# Patient Record
Sex: MALE | Race: WHITE | Employment: FULL TIME | URBAN - METROPOLITAN AREA
[De-identification: names, ages, dates, MRNs, and addresses within clinical notes are randomized per-mention and may not be internally consistent; named-entity substitution may affect disease eponyms.]

---

## 2021-06-09 ENCOUNTER — TRANSCRIBE ORDER (OUTPATIENT)
Dept: SCHEDULING | Age: 61
End: 2021-06-09

## 2021-06-09 DIAGNOSIS — M81.0 OSTEOPOROSIS: ICD-10-CM

## 2021-06-09 DIAGNOSIS — E55.9 VITAMIN D DEFICIENCY, UNSPECIFIED: Primary | ICD-10-CM

## 2021-06-21 ENCOUNTER — HOSPITAL ENCOUNTER (OUTPATIENT)
Dept: BONE DENSITY | Age: 61
Discharge: HOME | End: 2021-06-21
Attending: INTERNAL MEDICINE
Payer: COMMERCIAL

## 2021-06-21 DIAGNOSIS — M81.0 OSTEOPOROSIS: ICD-10-CM

## 2021-06-21 DIAGNOSIS — E55.9 VITAMIN D DEFICIENCY, UNSPECIFIED: ICD-10-CM

## 2021-06-21 PROCEDURE — 77080 DXA BONE DENSITY AXIAL: CPT

## 2022-06-24 ENCOUNTER — HOSPITAL ENCOUNTER (OUTPATIENT)
Dept: ULTRASOUND IMAGING | Age: 62
Discharge: HOME | End: 2022-06-24
Payer: COMMERCIAL

## 2022-06-24 ENCOUNTER — TRANSCRIBE ORDER (OUTPATIENT)
Dept: SCHEDULING | Age: 62
End: 2022-06-24

## 2022-06-24 DIAGNOSIS — K40.90 UNILATERAL INGUINAL HERNIA WITHOUT OBSTRUCTION OR GANGRENE, RECURRENCE NOT SPECIFIED: Primary | ICD-10-CM

## 2022-06-24 DIAGNOSIS — K40.90 UNILATERAL INGUINAL HERNIA WITHOUT OBSTRUCTION OR GANGRENE, RECURRENCE NOT SPECIFIED: ICD-10-CM

## 2022-06-24 PROCEDURE — 76881 US COMPL JOINT R-T W/IMG: CPT

## 2022-06-24 PROCEDURE — 76882 US LMTD JT/FCL EVL NVASC XTR: CPT

## 2022-07-22 ENCOUNTER — OFFICE VISIT (OUTPATIENT)
Dept: SURGERY | Age: 62
End: 2022-07-22
Payer: COMMERCIAL

## 2022-07-22 VITALS
TEMPERATURE: 97.8 F | RESPIRATION RATE: 18 BRPM | DIASTOLIC BLOOD PRESSURE: 66 MMHG | BODY MASS INDEX: 24.2 KG/M2 | SYSTOLIC BLOOD PRESSURE: 114 MMHG | WEIGHT: 169 LBS | HEART RATE: 69 BPM | HEIGHT: 70 IN | OXYGEN SATURATION: 100 %

## 2022-07-22 DIAGNOSIS — K42.9 UMBILICAL HERNIA WITHOUT OBSTRUCTION AND WITHOUT GANGRENE: ICD-10-CM

## 2022-07-22 DIAGNOSIS — K40.90 LEFT INGUINAL HERNIA: Primary | ICD-10-CM

## 2022-07-22 PROCEDURE — 99205 OFFICE O/P NEW HI 60 MIN: CPT | Performed by: SURGERY

## 2022-07-22 RX ORDER — SODIUM CHLORIDE 0.9 % (FLUSH) 0.9 %
5-40 SYRINGE (ML) INJECTION EVERY 8 HOURS
Status: CANCELLED | OUTPATIENT
Start: 2022-07-22

## 2022-07-22 RX ORDER — IRBESARTAN AND HYDROCHLOROTHIAZIDE 150; 12.5 MG/1; MG/1
TABLET, FILM COATED ORAL DAILY
COMMUNITY
Start: 2022-07-15

## 2022-07-22 RX ORDER — SODIUM CHLORIDE 0.9 % (FLUSH) 0.9 %
5-40 SYRINGE (ML) INJECTION AS NEEDED
Status: CANCELLED | OUTPATIENT
Start: 2022-07-22

## 2022-07-22 NOTE — PROGRESS NOTES
[facility]  General Surgery    Subjective:     CC: Left inguinal hernia     HPI: Patient is a very pleasant 60-year-old male with past medical history remarkable for vitamin D deficiency and umbilical hernia diagnosed as a child. He was referred by Dr. Birgit Avila for evaluation and management of left inguinal hernia. Patient states that he has been having pain in the left groin and discomfort mostly with sitting in an upright position for the past 2 months. He describes it as a pressure. He uses Advil in the evenings sometimes alleviate the pain. He denies any nausea vomiting diarrhea constipation. When I asked the patient if he had ever had a hernia in the past he said that his mother told him that he had a hernia at his [facility] when he was a child. They used a corn and a belt to help to decrease the hernia. He does notice bulging at the umbilicus with occasional tenderness with touch. 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 He works from home 3 days/week. The patient does work out with core exercises and weightlifting. There are no problems to display for this patient. Past Medical History:   Diagnosis Date    Calcium deficiency 2015    Tibia fracture 2015    Vitamin D deficiency 2015      History reviewed. No pertinent surgical history. Family History   Problem Relation Age of Onset    Diabetes Mother       Social History     Tobacco Use    Smoking status: Never    Smokeless tobacco: Not on file   Substance Use Topics    Alcohol use: No      Allergies   Allergen Reactions    Penicillins Hives    Pollen Extracts Sneezing       Prior to Admission medications    Medication Sig Start Date End Date Taking? Authorizing Provider   irbesartan-hydroCHLOROthiazide (AVALIDE) 150-12.5 mg per tablet  7/15/22  Yes Provider, Historical   cholecalciferol, vitamin D3, 50 mcg (2,000 unit) tab Take 2,000 Units by mouth daily.    Yes Provider, Historical   calcium citrate 200 mg (950 mg) tablet Take 200 mg by mouth daily. Yes Provider, Historical       Review of Systems:    14 systems were reviewed. The results are as above in the HPI and otherwise negative. Objective:     Vitals:    07/22/22 0855   BP: 114/66   Pulse: 69   Resp: 18   Temp: 97.8 °F (36.6 °C)   SpO2: 100%   Weight: 76.7 kg (169 lb)   Height: 5' 10\" (1.778 m)       Physical Exam:  GENERAL: alert, cooperative, no distress, appears stated age,   EYE: conjunctivae/corneas clear. PERRL, EOM's intact. THROAT & NECK: normal and no erythema or exudates noted. ,    LYMPHATIC: Cervical, supraclavicular, and axillary nodes normal. ,   LUNG: clear to auscultation bilaterally,   HEART: regular rate and rhythm, S1, S2 normal, no murmur, click, rub or gallop,   ABDOMEN: soft, non-distended. Tender at risk reducible umbilical hernia. Bowel sounds normal. No masses,  no organomegaly,   Genitalia: Both testicles are descended into the scrotum and nontender. No tenderness of the spermatic cord. Small bulge present with cough in the left inguinal canal.  No hernia palpable in the right. EXTREMITIES:  extremities normal, atraumatic, no cyanosis or edema,   SKIN: Normal.,   NEUROLOGIC: AOx3. Cranial nerves 2-12 and sensation grossly intact. ,     Data Review:  to be done    Mr. Sharla Torres has a reminder for a \"due or due soon\" health maintenance. I have asked that he contact his primary care provider for follow-up on this health maintenance. Impression:     Patient with symptomatic reducible left inguinal hernia and occasionally symptomatic umbilical hernia which is also reducible.     Plan:     Robot-assisted laparoscopic umbilical hernia repair and left inguinal hernia repairs with mesh-transverse abdominis plane block, right sided docking and 11.4 cm [place] ventral light ST mesh  Consent on chart  Preoperative orders written    Signed By: Anna Barnett MD     July 22, 2022

## 2022-07-22 NOTE — PROGRESS NOTES
Dominique Soriano is a 65 y.o. male  Chief Complaint   Patient presents with    New Patient     Left groin discomfort       1. Have you been to the ER, urgent care clinic since your last visit? Hospitalized since your last visit? No    2. Have you seen or consulted any other health care providers outside of the [address] [place] [address] since your last visit? Include any pap smears or colon screening. No.  . Past Medical History:   Diagnosis Date    Calcium deficiency 1    Tibia fracture 2015    Vitamin D deficiency 2015       History reviewed. No pertinent surgical history. Family History   Problem Relation Age of Onset    Diabetes Mother        Social History     Socioeconomic History    Marital status:    Tobacco Use    Smoking status: Never   Substance and Sexual Activity    Alcohol use: No       Review of Systems - Review of Systems   Constitutional: Negative. HENT: Negative. Eyes: Negative. Respiratory: Negative. Cardiovascular: Negative. Gastrointestinal: Negative. Genitourinary: Negative. Musculoskeletal: Negative. Skin: Negative. Neurological: Negative. Endo/Heme/Allergies: Negative. Psychiatric/Behavioral: Negative. Outpatient Medications Marked as Taking for the 7/22/22 encounter (Office Visit) with Olimpia Gentile MD   Medication Sig Dispense Refill    irbesartan-hydroCHLOROthiazide (AVALIDE) 150-12.5 mg per tablet       cholecalciferol, vitamin D3, 50 mcg (2,000 unit) tab Take 2,000 Units by mouth daily.  calcium citrate 200 mg (950 mg) tablet Take 200 mg by mouth daily.          Allergies   Allergen Reactions    Penicillins Hives    Pollen Extracts Sneezing       Vitals:    07/22/22 0855   BP: 114/66   Pulse: 69   Resp: 18   Temp: 97.8 °F (36.6 °C)   SpO2: 100%   Weight: 169 lb (76.7 kg)   Height: 5' 10\" (1.778 m)   PainSc:   0 - No pain

## 2022-08-30 ENCOUNTER — OFFICE VISIT (OUTPATIENT)
Dept: SURGERY | Age: 62
End: 2022-08-30

## 2022-08-30 VITALS
OXYGEN SATURATION: 99 % | HEART RATE: 81 BPM | TEMPERATURE: 97.9 F | SYSTOLIC BLOOD PRESSURE: 124 MMHG | DIASTOLIC BLOOD PRESSURE: 78 MMHG | WEIGHT: 165 LBS | HEIGHT: 70 IN | RESPIRATION RATE: 18 BRPM | BODY MASS INDEX: 23.62 KG/M2

## 2022-08-30 DIAGNOSIS — K40.90 INGUINAL HERNIA OF LEFT SIDE WITHOUT OBSTRUCTION OR GANGRENE: ICD-10-CM

## 2022-08-30 DIAGNOSIS — K42.9 UMBILICAL HERNIA WITHOUT OBSTRUCTION AND WITHOUT GANGRENE: ICD-10-CM

## 2022-08-30 DIAGNOSIS — R10.84 GENERALIZED ABDOMINAL PAIN: Primary | ICD-10-CM

## 2022-08-30 PROCEDURE — 99214 OFFICE O/P EST MOD 30 MIN: CPT | Performed by: STUDENT IN AN ORGANIZED HEALTH CARE EDUCATION/TRAINING PROGRAM

## 2022-08-30 NOTE — PROGRESS NOTES
Suyapa Winter is a 65 y.o. male  Chief Complaint   Patient presents with    New Patient     Left inguinal hernia     Visit Vitals  /78   Pulse 81   Temp 97.9 °F (36.6 °C)   Resp 18   Ht 5' 10\" (1.778 m)   Wt 165 lb (74.8 kg)   SpO2 99%   BMI 23.68 kg/m²       Weight Loss Metrics 8/30/2022 8/30/2022 8/8/2022 8/2/2022 7/22/2022 5/23/2016 12/3/2015   Pre op / Initial Wt 165 - - - - - -   Today's Wt - 165 lb - 169 lb 169 lb 175 lb 165 lb   BMI - 23.68 kg/m2 24.25 kg/m2 - 24.25 kg/m2 25.11 kg/m2 23.68 kg/m2   Ideal Body Wt 157 - - - - - -   Excess Body Wt 8 - - - - - -   Goal Wt 159 - - - - - -   Wt loss to date 0 - - - - - -   % Wt Loss 0 - - - - - -   80% EBW 6.4 - - - - - -       Body mass index is 23.68 kg/m². 1. Have you been to the ER, urgent care clinic since your last visit? Hospitalized since your last visit? No    2. Have you seen or consulted any other health care providers outside of the [address] [place] [address] since your last visit? Include any pap smears or colon screening.  No

## 2022-08-31 NOTE — PROGRESS NOTES
General Surgery Initial Consult    Patient: Dieter Pritchard MRN: [id number]  SSN: [id number]    YOB: 1960  Age: 65 y.o. Sex: male      Subjective:      Dieter Pritchard is a 65 y.o. male who is being seen for initial consultation for symptomatic left inguinal hernia. Mr. Sharla Torres was seen previously by Dr. Lewis Lipscomb and scheduled for surgery, however I have been asked to assume his care given Dr. Lewis Lipscomb current absence. He notes first experiencing discomfort in the left groin starting in May of this year. He noticed it primarily when sitting for long periods as well as during strenuous exercise. He denies any specific inciting events. He says that he had been going to the gym approximately 3 times a week though he has minimized his activity since the discomfort started. He denies any specific bulges. He denies any nausea, vomiting, changes in his bowel habits, or signs or symptoms of obstruction. He does not think the discomfort has increased over time though he has been modifying his activities accordingly. He was also noted previously to have a umbilical hernia. He states this has been present most of his life. He says he was told by his mother it was quite large after birth and describes something similar to an omphalocele. He denies any symptoms from the umbilical hernia. Allergies   Allergen Reactions    Penicillins Hives    Pollen Extracts Sneezing     Current Outpatient Medications   Medication Sig Dispense Refill    irbesartan-hydroCHLOROthiazide (AVALIDE) 150-12.5 mg per tablet daily. cholecalciferol, vitamin D3, 50 mcg (2,000 unit) tab Take 2,000 Units by mouth daily. calcium citrate 200 mg (950 mg) tablet Take 200 mg by mouth daily.        Past Medical History:   Diagnosis Date    Calcium deficiency 01/01/2015    Hypertension     Tibia fracture 01/01/2015    Vitamin D deficiency 01/01/2015     Past Surgical History:   Procedure Laterality Date    HX APPENDECTOMY      HX HEENT  2018    jaw surgery      Social History     Tobacco Use    Smoking status: Never    Smokeless tobacco: Never   Substance Use Topics    Alcohol use: No     Family History   Problem Relation Age of Onset    Diabetes Mother    Stroke, several family members        Review of Systems:    General: Denies fevers, chills, night sweats, fatigue, weight loss, or weight gain. HEENT: Denies changes in auditory or visual acuity, recurrent pharyngitis, epistaxis, chronic rhinorrhea, vertigo    Respiratory: Denies increasing shortness of breath, productive cough, hemoptysis    Cardiac: Denies known history of cardiac disease, heart murmur, palpitations    GI: Left groin discomfort as per HPI. Denies dysphagia, recurrent emesis, hematemesis, changes in bowel habits, hematochezia, melena    : Denies hematuria frequency urgency dysuria    Musculoskeletal: Denies fractures, dislocations    Neurologic: Denies history of CVA, paralysis paresthesias, recurrent cephalgia, seizures    Endocrine: Denies polyuria, polydipsia, polyphagia, heat and cold intolerance    Lymph/heme: Denies a history of malignancy, anemia, bruising, blood transfusions    Integumentary: Negative for dermatitis     Psych: Denies a history of depression or anxiety    Objective:     Vitals:    08/30/22 1407   BP: 124/78   Pulse: 81   Resp: 18   Temp: 97.9 °F (36.6 °C)   SpO2: 99%   Weight: 74.8 kg (165 lb)   Height: 5' 10\" (1.778 m)        General: no acute distress, nontoxic in appearance. Head: Normocephalic, atraumatic  Mouth: Clear, no overt lesions, oral mucosa is pink and moist.  Neck: Supple, no masses, no adenopathy or carotid bruits, trachea midline  Resp: Clear to auscultation bilaterally, no wheezing, rhonchi, or rales, excursions normal and symmetrical.  Cardio: Regular rate and rhythm, no murmurs, clicks, gallops, or rubs. Abdomen: soft, nontender, nondistended, normoactive bowel sounds.   There is a small, mildly tender approximately 1 cm umbilical hernia. On palpation of the right groin there are some laxity over the myopectineal orifice with Valsalva. Direct palpation of the inguinal ring reveals a nontender sliding bulge. Similarly palpation of the left groin reveals some weakness of the inguinal floor. Palpation of the internal ring reveals a tender sliding protrusion with Valsalva. Extremities: Warm, well perfused, no tenderness or swelling, normal gait/station, without edema or varicosities  Neuro: Sensation and strength grossly intact and symmetrical.  Psych: Alert and oriented to person, place, and time. Labs:     N/A    Imaging:     N/A      Assessment: This patient is a 65 y.o. male with symptomatic left inguinal hernia, asymptomatic umbilical hernia and a possibly asymptomatic right inguinal hernia also based on exam      Plan:     I had a long discussion with the patient regarding the proposed surgical plan, which differs a bit from prior planned surgery. I will plan for a robotic approach and to fix the left-sided hernia. I had a discussion with the patient regarding whether to repair an asymptomatic right-sided hernia if indeed one is found during the procedure as I suspect it may given his exam.  We discussed the pros and cons of fixing this hernia at the time of surgery and he has requested that I fix any right-sided hernia that is visualized. I also discussed approach to repairing his umbilical hernia. On exam it appears to be only about 1 cm, and given that it is asymptomatic I think he would be better served with a primary repair for this hernia rather than a large IPOM mesh which might be overkill in this case. The patient has elected for laparoscopic/robotic inguinal hernia repair. Risks of this procedure, including DVT, mesh infection or intolerance, acute or chronic pain, hernia recurrence, port site hernia, surgical site infection were discussed and he is agreeable to proceed.  We discussed lifting precautions and the expected recovery timeline in depth. He understands this recovery timeline is not absolute and can vary for each individual patient. He understands the plan as detailed above and is agreeable to proceed.       Signed By: Alison Ford MD     August 30, 2022

## 2022-08-31 NOTE — H&P (VIEW-ONLY)
General Surgery Initial Consult    Patient: Alex Figueroa MRN: [id number]  SSN: [id number]    YOB: 1960  Age: 65 y.o. Sex: male      Subjective:      Alex Figueroa is a 65 y.o. male who is being seen for initial consultation for symptomatic left inguinal hernia. Mr. Jem Roberson was seen previously by Dr. Umm Kirby and scheduled for surgery, however I have been asked to assume his care given Dr. Umm Kirby current absence. He notes first experiencing discomfort in the left groin starting in May of this year. He noticed it primarily when sitting for long periods as well as during strenuous exercise. He denies any specific inciting events. He says that he had been going to the gym approximately 3 times a week though he has minimized his activity since the discomfort started. He denies any specific bulges. He denies any nausea, vomiting, changes in his bowel habits, or signs or symptoms of obstruction. He does not think the discomfort has increased over time though he has been modifying his activities accordingly. He was also noted previously to have a umbilical hernia. He states this has been present most of his life. He says he was told by his mother it was quite large after birth and describes something similar to an omphalocele. He denies any symptoms from the umbilical hernia. Allergies   Allergen Reactions    Penicillins Hives    Pollen Extracts Sneezing     Current Outpatient Medications   Medication Sig Dispense Refill    irbesartan-hydroCHLOROthiazide (AVALIDE) 150-12.5 mg per tablet daily. cholecalciferol, vitamin D3, 50 mcg (2,000 unit) tab Take 2,000 Units by mouth daily. calcium citrate 200 mg (950 mg) tablet Take 200 mg by mouth daily.        Past Medical History:   Diagnosis Date    Calcium deficiency 01/01/2015    Hypertension     Tibia fracture 01/01/2015    Vitamin D deficiency 01/01/2015     Past Surgical History:   Procedure Laterality Date    HX APPENDECTOMY      HX HEENT  2018    jaw surgery      Social History     Tobacco Use    Smoking status: Never    Smokeless tobacco: Never   Substance Use Topics    Alcohol use: No     Family History   Problem Relation Age of Onset    Diabetes Mother    Stroke, several family members        Review of Systems:    General: Denies fevers, chills, night sweats, fatigue, weight loss, or weight gain. HEENT: Denies changes in auditory or visual acuity, recurrent pharyngitis, epistaxis, chronic rhinorrhea, vertigo    Respiratory: Denies increasing shortness of breath, productive cough, hemoptysis    Cardiac: Denies known history of cardiac disease, heart murmur, palpitations    GI: Left groin discomfort as per HPI. Denies dysphagia, recurrent emesis, hematemesis, changes in bowel habits, hematochezia, melena    : Denies hematuria frequency urgency dysuria    Musculoskeletal: Denies fractures, dislocations    Neurologic: Denies history of CVA, paralysis paresthesias, recurrent cephalgia, seizures    Endocrine: Denies polyuria, polydipsia, polyphagia, heat and cold intolerance    Lymph/heme: Denies a history of malignancy, anemia, bruising, blood transfusions    Integumentary: Negative for dermatitis     Psych: Denies a history of depression or anxiety    Objective:     Vitals:    08/30/22 1407   BP: 124/78   Pulse: 81   Resp: 18   Temp: 97.9 °F (36.6 °C)   SpO2: 99%   Weight: 74.8 kg (165 lb)   Height: 5' 10\" (1.778 m)        General: no acute distress, nontoxic in appearance. Head: Normocephalic, atraumatic  Mouth: Clear, no overt lesions, oral mucosa is pink and moist.  Neck: Supple, no masses, no adenopathy or carotid bruits, trachea midline  Resp: Clear to auscultation bilaterally, no wheezing, rhonchi, or rales, excursions normal and symmetrical.  Cardio: Regular rate and rhythm, no murmurs, clicks, gallops, or rubs. Abdomen: soft, nontender, nondistended, normoactive bowel sounds.   There is a small, mildly tender approximately 1 cm umbilical hernia. On palpation of the right groin there are some laxity over the myopectineal orifice with Valsalva. Direct palpation of the inguinal ring reveals a nontender sliding bulge. Similarly palpation of the left groin reveals some weakness of the inguinal floor. Palpation of the internal ring reveals a tender sliding protrusion with Valsalva. Extremities: Warm, well perfused, no tenderness or swelling, normal gait/station, without edema or varicosities  Neuro: Sensation and strength grossly intact and symmetrical.  Psych: Alert and oriented to person, place, and time. Labs:     N/A    Imaging:     N/A      Assessment: This patient is a 65 y.o. male with symptomatic left inguinal hernia, asymptomatic umbilical hernia and a possibly asymptomatic right inguinal hernia also based on exam      Plan:     I had a long discussion with the patient regarding the proposed surgical plan, which differs a bit from prior planned surgery. I will plan for a robotic approach and to fix the left-sided hernia. I had a discussion with the patient regarding whether to repair an asymptomatic right-sided hernia if indeed one is found during the procedure as I suspect it may given his exam.  We discussed the pros and cons of fixing this hernia at the time of surgery and he has requested that I fix any right-sided hernia that is visualized. I also discussed approach to repairing his umbilical hernia. On exam it appears to be only about 1 cm, and given that it is asymptomatic I think he would be better served with a primary repair for this hernia rather than a large IPOM mesh which might be overkill in this case. The patient has elected for laparoscopic/robotic inguinal hernia repair. Risks of this procedure, including DVT, mesh infection or intolerance, acute or chronic pain, hernia recurrence, port site hernia, surgical site infection were discussed and he is agreeable to proceed.  We discussed lifting precautions and the expected recovery timeline in depth. He understands this recovery timeline is not absolute and can vary for each individual patient. He understands the plan as detailed above and is agreeable to proceed.       Signed By: Que Andujar MD     August 30, 2022

## 2022-09-02 NOTE — PERIOP NOTES
PRE-SURGICAL INSTRUCTIONS        Patient's Name:  Bradford Esteban      HGRCF'I Date:  9/2/2022            Covid Testing Date and Time:  vaccinated    Surgery Date:  9/12/2022                Do NOT eat or drink anything, including candy, gum, or ice chips after midnight on 9/12, unless you have specific instructions from your surgeon or anesthesia provider to do so. You may brush your teeth before coming to the hospital.  No smoking 24 hours prior to the day of surgery. No alcohol 24 hours prior to the day of surgery. No recreational drugs for one week prior to the day of surgery. Leave all valuables, including money/purse, at home. Remove all jewelry, nail polish, acrylic nails, and makeup (including mascara); no lotions powders, deodorant, or perfume/cologne/after shave on the skin. Follow instruction for Hibiclens washes and CHG wipes from surgeon's office. Glasses/contact lenses and dentures may be worn to the hospital.  They will be removed prior to surgery. Call your doctor if symptoms of a cold or illness develop within 24-48 hours prior to your surgery. 11.  If you are having an outpatient procedure, please make arrangements for a responsible ADULT TO [address] [place] [address] and stay with you for 24 hours after your surgery. 12. ONE VISITOR in the hospital at this time for outpatient procedures. Exceptions may be made for surgical admissions, per nursing unit guidelines      Special Instructions:      Bring list of CURRENT medications. Bring any pertinent legal medical records. Take these medications the morning of surgery with a sip of water:  as directed by MD   Follow physician instructions about stopping anticoagulants. Complete bowel prep per MD instructions. On the day of surgery, come in the main entrance of [facility]. Let the  at the desk know you are there for surgery.   A staff member will come escort you to the surgical area on the second floor.    If you have any questions or concerns, please do not hesitate to call:     (Prior to the day of surgery) [facility] department:  [phone]   (Day of surgery) Pre-Op department:  [phone]    These surgical instructions were reviewed with Manav Gifford during the [facility] phone call.

## 2022-09-09 ENCOUNTER — ANESTHESIA EVENT (OUTPATIENT)
Dept: SURGERY | Age: 62
End: 2022-09-09
Payer: COMMERCIAL

## 2022-09-12 ENCOUNTER — ANESTHESIA (OUTPATIENT)
Dept: SURGERY | Age: 62
End: 2022-09-12
Payer: COMMERCIAL

## 2022-09-12 ENCOUNTER — HOSPITAL ENCOUNTER (OUTPATIENT)
Age: 62
Setting detail: OUTPATIENT SURGERY
Discharge: HOME | End: 2022-09-12
Attending: STUDENT IN AN ORGANIZED HEALTH CARE EDUCATION/TRAINING PROGRAM | Admitting: STUDENT IN AN ORGANIZED HEALTH CARE EDUCATION/TRAINING PROGRAM
Payer: COMMERCIAL

## 2022-09-12 VITALS
OXYGEN SATURATION: 100 % | RESPIRATION RATE: 18 BRPM | WEIGHT: 161.5 LBS | HEART RATE: 71 BPM | SYSTOLIC BLOOD PRESSURE: 110 MMHG | BODY MASS INDEX: 23.12 KG/M2 | HEIGHT: 70 IN | DIASTOLIC BLOOD PRESSURE: 64 MMHG | TEMPERATURE: 98.3 F

## 2022-09-12 DIAGNOSIS — K42.9 UMBILICAL HERNIA WITHOUT OBSTRUCTION AND WITHOUT GANGRENE: ICD-10-CM

## 2022-09-12 DIAGNOSIS — K40.91 UNILATERAL RECURRENT INGUINAL HERNIA WITHOUT OBSTRUCTION OR GANGRENE: Primary | ICD-10-CM

## 2022-09-12 PROCEDURE — 77030012770 HC TRCR OPT FX AMR -B: Performed by: STUDENT IN AN ORGANIZED HEALTH CARE EDUCATION/TRAINING PROGRAM

## 2022-09-12 PROCEDURE — 77030040361 HC SLV COMPR DVT MDII -B: Performed by: STUDENT IN AN ORGANIZED HEALTH CARE EDUCATION/TRAINING PROGRAM

## 2022-09-12 PROCEDURE — 2709999900 HC NON-CHARGEABLE SUPPLY: Performed by: STUDENT IN AN ORGANIZED HEALTH CARE EDUCATION/TRAINING PROGRAM

## 2022-09-12 PROCEDURE — 76010000876 HC OR TIME 2 TO 2.5HR INTENSV - TIER 2: Performed by: STUDENT IN AN ORGANIZED HEALTH CARE EDUCATION/TRAINING PROGRAM

## 2022-09-12 PROCEDURE — 77030018673: Performed by: STUDENT IN AN ORGANIZED HEALTH CARE EDUCATION/TRAINING PROGRAM

## 2022-09-12 PROCEDURE — 99024 POSTOP FOLLOW-UP VISIT: CPT | Performed by: STUDENT IN AN ORGANIZED HEALTH CARE EDUCATION/TRAINING PROGRAM

## 2022-09-12 PROCEDURE — C1781 MESH (IMPLANTABLE): HCPCS | Performed by: STUDENT IN AN ORGANIZED HEALTH CARE EDUCATION/TRAINING PROGRAM

## 2022-09-12 PROCEDURE — 77030040830 HC CATH URETH FOL MDII -A: Performed by: STUDENT IN AN ORGANIZED HEALTH CARE EDUCATION/TRAINING PROGRAM

## 2022-09-12 PROCEDURE — 74011000250 HC RX REV CODE- 250: Performed by: NURSE ANESTHETIST, CERTIFIED REGISTERED

## 2022-09-12 PROCEDURE — 76210000006 HC OR PH I REC 0.5 TO 1 HR: Performed by: STUDENT IN AN ORGANIZED HEALTH CARE EDUCATION/TRAINING PROGRAM

## 2022-09-12 PROCEDURE — 77030013079 HC BLNKT BAIR HGGR 3M -A: Performed by: ANESTHESIOLOGY

## 2022-09-12 PROCEDURE — 74011250637 HC RX REV CODE- 250/637: Performed by: NURSE ANESTHETIST, CERTIFIED REGISTERED

## 2022-09-12 PROCEDURE — 74011000250 HC RX REV CODE- 250: Performed by: STUDENT IN AN ORGANIZED HEALTH CARE EDUCATION/TRAINING PROGRAM

## 2022-09-12 PROCEDURE — 76210000026 HC REC RM PH II 1 TO 1.5 HR: Performed by: STUDENT IN AN ORGANIZED HEALTH CARE EDUCATION/TRAINING PROGRAM

## 2022-09-12 PROCEDURE — 77030018836 HC SOL IRR NACL ICUM -A: Performed by: STUDENT IN AN ORGANIZED HEALTH CARE EDUCATION/TRAINING PROGRAM

## 2022-09-12 PROCEDURE — 77030020703 HC SEAL CANN DISP INTU -B: Performed by: STUDENT IN AN ORGANIZED HEALTH CARE EDUCATION/TRAINING PROGRAM

## 2022-09-12 PROCEDURE — 77030022704 HC SUT VLOC COVD -B: Performed by: STUDENT IN AN ORGANIZED HEALTH CARE EDUCATION/TRAINING PROGRAM

## 2022-09-12 PROCEDURE — 74011250636 HC RX REV CODE- 250/636: Performed by: NURSE ANESTHETIST, CERTIFIED REGISTERED

## 2022-09-12 PROCEDURE — 74011250636 HC RX REV CODE- 250/636: Performed by: ANESTHESIOLOGY

## 2022-09-12 PROCEDURE — 77030033188 HC TBNG FLTRD BIIFUR DISP CNMD -C: Performed by: STUDENT IN AN ORGANIZED HEALTH CARE EDUCATION/TRAINING PROGRAM

## 2022-09-12 PROCEDURE — 77030040922 HC BLNKT HYPOTHRM STRY -A: Performed by: STUDENT IN AN ORGANIZED HEALTH CARE EDUCATION/TRAINING PROGRAM

## 2022-09-12 PROCEDURE — 74011250636 HC RX REV CODE- 250/636: Performed by: STUDENT IN AN ORGANIZED HEALTH CARE EDUCATION/TRAINING PROGRAM

## 2022-09-12 PROCEDURE — 77030031139 HC SUT VCRL2 J&J -A: Performed by: STUDENT IN AN ORGANIZED HEALTH CARE EDUCATION/TRAINING PROGRAM

## 2022-09-12 PROCEDURE — 77030002933 HC SUT MCRYL J&J -A: Performed by: STUDENT IN AN ORGANIZED HEALTH CARE EDUCATION/TRAINING PROGRAM

## 2022-09-12 PROCEDURE — 76060000035 HC ANESTHESIA 2 TO 2.5 HR: Performed by: STUDENT IN AN ORGANIZED HEALTH CARE EDUCATION/TRAINING PROGRAM

## 2022-09-12 PROCEDURE — 77030016151 HC PROTCTR LNS DFOG COVD -B: Performed by: STUDENT IN AN ORGANIZED HEALTH CARE EDUCATION/TRAINING PROGRAM

## 2022-09-12 PROCEDURE — 77030035277 HC OBTRTR BLDELSS DISP INTU -B: Performed by: STUDENT IN AN ORGANIZED HEALTH CARE EDUCATION/TRAINING PROGRAM

## 2022-09-12 PROCEDURE — 49585 PR REPAIR UMBILICAL HERN,5+Y/O,REDUC: CPT | Performed by: STUDENT IN AN ORGANIZED HEALTH CARE EDUCATION/TRAINING PROGRAM

## 2022-09-12 PROCEDURE — 77030008683 HC TU ET CUF COVD -A: Performed by: ANESTHESIOLOGY

## 2022-09-12 PROCEDURE — 49650 LAP ING HERNIA REPAIR INIT: CPT | Performed by: STUDENT IN AN ORGANIZED HEALTH CARE EDUCATION/TRAINING PROGRAM

## 2022-09-12 PROCEDURE — 00790 ANES IPER UPR ABD NOS: CPT | Performed by: ANESTHESIOLOGY

## 2022-09-12 PROCEDURE — S2900 ROBOTIC SURGICAL SYSTEM: HCPCS | Performed by: STUDENT IN AN ORGANIZED HEALTH CARE EDUCATION/TRAINING PROGRAM

## 2022-09-12 DEVICE — MESH CS LEFT LARGE 10CM X 16CM: Type: IMPLANTABLE DEVICE | Site: GROIN | Status: FUNCTIONAL

## 2022-09-12 RX ORDER — ROCURONIUM BROMIDE 10 MG/ML
INJECTION, SOLUTION INTRAVENOUS AS NEEDED
Status: DISCONTINUED | OUTPATIENT
Start: 2022-09-12 | End: 2022-09-12 | Stop reason: HOSPADM

## 2022-09-12 RX ORDER — SODIUM CHLORIDE 0.9 % (FLUSH) 0.9 %
5-40 SYRINGE (ML) INJECTION AS NEEDED
Status: CANCELLED | OUTPATIENT
Start: 2022-09-12

## 2022-09-12 RX ORDER — ONDANSETRON 2 MG/ML
4 INJECTION INTRAMUSCULAR; INTRAVENOUS
Status: CANCELLED | OUTPATIENT
Start: 2022-09-12

## 2022-09-12 RX ORDER — SODIUM CHLORIDE 0.9 % (FLUSH) 0.9 %
5-40 SYRINGE (ML) INJECTION EVERY 8 HOURS
Status: DISCONTINUED | OUTPATIENT
Start: 2022-09-12 | End: 2022-09-12 | Stop reason: HOSPADM

## 2022-09-12 RX ORDER — HYDROMORPHONE HYDROCHLORIDE 2 MG/ML
0.5 INJECTION, SOLUTION INTRAMUSCULAR; INTRAVENOUS; SUBCUTANEOUS
Status: CANCELLED | OUTPATIENT
Start: 2022-09-12

## 2022-09-12 RX ORDER — SODIUM CHLORIDE 0.9 % (FLUSH) 0.9 %
5-40 SYRINGE (ML) INJECTION AS NEEDED
Status: DISCONTINUED | OUTPATIENT
Start: 2022-09-12 | End: 2022-09-12 | Stop reason: HOSPADM

## 2022-09-12 RX ORDER — DEXAMETHASONE SODIUM PHOSPHATE 4 MG/ML
INJECTION, SOLUTION INTRA-ARTICULAR; INTRALESIONAL; INTRAMUSCULAR; INTRAVENOUS; SOFT TISSUE AS NEEDED
Status: DISCONTINUED | OUTPATIENT
Start: 2022-09-12 | End: 2022-09-12 | Stop reason: HOSPADM

## 2022-09-12 RX ORDER — SODIUM CHLORIDE, SODIUM LACTATE, POTASSIUM CHLORIDE, CALCIUM CHLORIDE 600; 310; 30; 20 MG/100ML; MG/100ML; MG/100ML; MG/100ML
25 INJECTION, SOLUTION INTRAVENOUS CONTINUOUS
Status: DISCONTINUED | OUTPATIENT
Start: 2022-09-12 | End: 2022-09-12 | Stop reason: HOSPADM

## 2022-09-12 RX ORDER — DEXMEDETOMIDINE HYDROCHLORIDE 100 UG/ML
INJECTION, SOLUTION INTRAVENOUS AS NEEDED
Status: DISCONTINUED | OUTPATIENT
Start: 2022-09-12 | End: 2022-09-12 | Stop reason: HOSPADM

## 2022-09-12 RX ORDER — TAMSULOSIN HYDROCHLORIDE 0.4 MG/1
0.4 CAPSULE ORAL ONCE
Status: DISCONTINUED | OUTPATIENT
Start: 2022-09-12 | End: 2022-09-12 | Stop reason: HOSPADM

## 2022-09-12 RX ORDER — SUCCINYLCHOLINE CHLORIDE 20 MG/ML
INJECTION INTRAMUSCULAR; INTRAVENOUS AS NEEDED
Status: DISCONTINUED | OUTPATIENT
Start: 2022-09-12 | End: 2022-09-12 | Stop reason: HOSPADM

## 2022-09-12 RX ORDER — BUPIVACAINE HYDROCHLORIDE 2.5 MG/ML
INJECTION, SOLUTION EPIDURAL; INFILTRATION; INTRACAUDAL AS NEEDED
Status: DISCONTINUED | OUTPATIENT
Start: 2022-09-12 | End: 2022-09-12 | Stop reason: HOSPADM

## 2022-09-12 RX ORDER — VANCOMYCIN HYDROCHLORIDE
1250 ONCE
Status: COMPLETED | OUTPATIENT
Start: 2022-09-12 | End: 2022-09-12

## 2022-09-12 RX ORDER — SODIUM CHLORIDE, SODIUM LACTATE, POTASSIUM CHLORIDE, CALCIUM CHLORIDE 600; 310; 30; 20 MG/100ML; MG/100ML; MG/100ML; MG/100ML
50 INJECTION, SOLUTION INTRAVENOUS CONTINUOUS
Status: CANCELLED | OUTPATIENT
Start: 2022-09-12

## 2022-09-12 RX ORDER — SODIUM CHLORIDE 0.9 % (FLUSH) 0.9 %
5-40 SYRINGE (ML) INJECTION EVERY 8 HOURS
Status: CANCELLED | OUTPATIENT
Start: 2022-09-12

## 2022-09-12 RX ORDER — LIDOCAINE HYDROCHLORIDE 10 MG/ML
0.1 INJECTION, SOLUTION EPIDURAL; INFILTRATION; INTRACAUDAL; PERINEURAL AS NEEDED
Status: DISCONTINUED | OUTPATIENT
Start: 2022-09-12 | End: 2022-09-12 | Stop reason: HOSPADM

## 2022-09-12 RX ORDER — NEOSTIGMINE METHYLSULFATE 1 MG/ML
INJECTION, SOLUTION INTRAVENOUS AS NEEDED
Status: DISCONTINUED | OUTPATIENT
Start: 2022-09-12 | End: 2022-09-12 | Stop reason: HOSPADM

## 2022-09-12 RX ORDER — ONDANSETRON 2 MG/ML
INJECTION INTRAMUSCULAR; INTRAVENOUS AS NEEDED
Status: DISCONTINUED | OUTPATIENT
Start: 2022-09-12 | End: 2022-09-12 | Stop reason: HOSPADM

## 2022-09-12 RX ORDER — FAMOTIDINE 20 MG/1
20 TABLET, FILM COATED ORAL ONCE
Status: COMPLETED | OUTPATIENT
Start: 2022-09-12 | End: 2022-09-12

## 2022-09-12 RX ORDER — FENTANYL CITRATE 50 UG/ML
25 INJECTION, SOLUTION INTRAMUSCULAR; INTRAVENOUS
Status: CANCELLED | OUTPATIENT
Start: 2022-09-12

## 2022-09-12 RX ORDER — GLYCOPYRROLATE 0.2 MG/ML
INJECTION INTRAMUSCULAR; INTRAVENOUS AS NEEDED
Status: DISCONTINUED | OUTPATIENT
Start: 2022-09-12 | End: 2022-09-12 | Stop reason: HOSPADM

## 2022-09-12 RX ORDER — KETOROLAC TROMETHAMINE 15 MG/ML
15 INJECTION, SOLUTION INTRAMUSCULAR; INTRAVENOUS ONCE
Status: COMPLETED | OUTPATIENT
Start: 2022-09-12 | End: 2022-09-12

## 2022-09-12 RX ORDER — PROCHLORPERAZINE EDISYLATE 5 MG/ML
5 INJECTION INTRAMUSCULAR; INTRAVENOUS
Status: CANCELLED | OUTPATIENT
Start: 2022-09-12

## 2022-09-12 RX ORDER — FENTANYL CITRATE 50 UG/ML
INJECTION, SOLUTION INTRAMUSCULAR; INTRAVENOUS AS NEEDED
Status: DISCONTINUED | OUTPATIENT
Start: 2022-09-12 | End: 2022-09-12 | Stop reason: HOSPADM

## 2022-09-12 RX ORDER — PROPOFOL 10 MG/ML
INJECTION, EMULSION INTRAVENOUS AS NEEDED
Status: DISCONTINUED | OUTPATIENT
Start: 2022-09-12 | End: 2022-09-12 | Stop reason: HOSPADM

## 2022-09-12 RX ORDER — MIDAZOLAM HYDROCHLORIDE 1 MG/ML
INJECTION, SOLUTION INTRAMUSCULAR; INTRAVENOUS AS NEEDED
Status: DISCONTINUED | OUTPATIENT
Start: 2022-09-12 | End: 2022-09-12 | Stop reason: HOSPADM

## 2022-09-12 RX ORDER — PROMETHAZINE HYDROCHLORIDE 12.5 MG/1
12.5 TABLET ORAL
Status: CANCELLED | OUTPATIENT
Start: 2022-09-12

## 2022-09-12 RX ORDER — DOCUSATE SODIUM 100 MG/1
100 CAPSULE, LIQUID FILLED ORAL 2 TIMES DAILY
Qty: 60 CAPSULE | Refills: 0 | Status: SHIPPED | OUTPATIENT
Start: 2022-09-12 | End: 2022-10-12

## 2022-09-12 RX ORDER — LIDOCAINE HYDROCHLORIDE 20 MG/ML
INJECTION, SOLUTION EPIDURAL; INFILTRATION; INTRACAUDAL; PERINEURAL AS NEEDED
Status: DISCONTINUED | OUTPATIENT
Start: 2022-09-12 | End: 2022-09-12 | Stop reason: HOSPADM

## 2022-09-12 RX ADMIN — PROPOFOL 200 MG: 10 INJECTION, EMULSION INTRAVENOUS at 09:54

## 2022-09-12 RX ADMIN — DEXMEDETOMIDINE HYDROCHLORIDE 10 MCG: 100 INJECTION, SOLUTION INTRAVENOUS at 10:22

## 2022-09-12 RX ADMIN — DEXAMETHASONE SODIUM PHOSPHATE 4 MG: 4 INJECTION, SOLUTION INTRAMUSCULAR; INTRAVENOUS at 10:05

## 2022-09-12 RX ADMIN — SUCCINYLCHOLINE CHLORIDE 100 MG: 20 INJECTION, SOLUTION INTRAMUSCULAR; INTRAVENOUS at 09:54

## 2022-09-12 RX ADMIN — DEXMEDETOMIDINE HYDROCHLORIDE 10 MCG: 100 INJECTION, SOLUTION INTRAVENOUS at 11:07

## 2022-09-12 RX ADMIN — MIDAZOLAM HYDROCHLORIDE 2 MG: 2 INJECTION, SOLUTION INTRAMUSCULAR; INTRAVENOUS at 09:46

## 2022-09-12 RX ADMIN — ROCURONIUM BROMIDE 30 MG: 50 INJECTION INTRAVENOUS at 10:05

## 2022-09-12 RX ADMIN — NEOSTIGMINE METHYLSULFATE 3 MG: 1 INJECTION INTRAVENOUS at 11:38

## 2022-09-12 RX ADMIN — FAMOTIDINE 20 MG: 20 TABLET ORAL at 07:54

## 2022-09-12 RX ADMIN — KETOROLAC TROMETHAMINE 15 MG: 15 INJECTION, SOLUTION INTRAMUSCULAR; INTRAVENOUS at 13:23

## 2022-09-12 RX ADMIN — LIDOCAINE HYDROCHLORIDE 60 MG: 20 INJECTION, SOLUTION EPIDURAL; INFILTRATION; INTRACAUDAL; PERINEURAL at 09:54

## 2022-09-12 RX ADMIN — ONDANSETRON 4 MG: 2 INJECTION INTRAMUSCULAR; INTRAVENOUS at 09:49

## 2022-09-12 RX ADMIN — SODIUM CHLORIDE, POTASSIUM CHLORIDE, SODIUM LACTATE AND CALCIUM CHLORIDE 25 ML/HR: 600; 310; 30; 20 INJECTION, SOLUTION INTRAVENOUS at 07:54

## 2022-09-12 RX ADMIN — LIDOCAINE HYDROCHLORIDE 40 MG: 20 INJECTION, SOLUTION EPIDURAL; INFILTRATION; INTRACAUDAL; PERINEURAL at 10:23

## 2022-09-12 RX ADMIN — DEXMEDETOMIDINE HYDROCHLORIDE 10 MCG: 100 INJECTION, SOLUTION INTRAVENOUS at 09:50

## 2022-09-12 RX ADMIN — GLYCOPYRROLATE 0.4 MG: 0.2 INJECTION INTRAMUSCULAR; INTRAVENOUS at 11:38

## 2022-09-12 RX ADMIN — FENTANYL CITRATE 100 MCG: 50 INJECTION, SOLUTION INTRAMUSCULAR; INTRAVENOUS at 09:53

## 2022-09-12 RX ADMIN — VANCOMYCIN HYDROCHLORIDE 1250 MG: 500 INJECTION, POWDER, LYOPHILIZED, FOR SOLUTION INTRAVENOUS at 07:59

## 2022-09-12 NOTE — INTERVAL H&P NOTE
Update History & Physical    The Patient's History and Physical of August 30, 2022 was reviewed with the patient and I examined the patient. There was no change. The surgical site was confirmed by the patient and me. Plan:  The risk, benefits, expected outcome, and alternative to the recommended procedure have been discussed with the patient. Patient understands and wants to proceed with the procedure.     Electronically signed by Jeff Sewell MD on 9/12/2022 at 9:40 AM

## 2022-09-12 NOTE — ANESTHESIA POSTPROCEDURE EVALUATION
Procedure(s):  ROBOTIC ASSISTED LAPAROSCOPIC LEFT INGUINAL HERNIA REPAIR WITH MESH, OPEN PRIMARY UMBILICAL HERNIA REPAIR. general    Anesthesia Post Evaluation      Multimodal analgesia: multimodal analgesia used between 6 hours prior to anesthesia start to PACU discharge  Patient location during evaluation: bedside  Patient participation: complete - patient participated  Level of consciousness: awake  Pain management: adequate  Airway patency: patent  Anesthetic complications: no  Cardiovascular status: stable  Respiratory status: acceptable  Hydration status: acceptable  Post anesthesia nausea and vomiting:  controlled      INITIAL Post-op Vital signs:   Vitals Value Taken Time   /63 09/12/22 1325   Temp 36.9 °C (98.5 °F) 09/12/22 1157   Pulse 66 09/12/22 1329   Resp 17 09/12/22 1329   SpO2 95 % 09/12/22 1329   Vitals shown include unvalidated device data.

## 2022-09-12 NOTE — OP NOTES
OPERATIVE REPORT     Patient:Fan Dwyer   : 1960  Medical Record Number:[id number]    Pre-operative Diagnosis:  Umbilical hernia without obstruction or gangrene [K42.9]  Left inguinal hernia [K40.90]                  Post-operative Diagnosis: Umbilical hernia without obstruction or gangrene [K42.9]  Left inguinal hernia [K40.90]                 Procedure: Procedure(s):  ROBOTIC ASSISTED LAPAROSCOPIC LEFT INGUINAL HERNIA REPAIR WITH MESH, OPEN PRIMARY UMBILICAL HERNIA REPAIR                  Location: Hospital: [facility]                  Surgeon: Nuha Ernandez MD                  Assistant:   (there are no qualified interns, residents, or any other qualified house physicians available to assist with this surgery) - performed retraction of various structures, facilitated creating small bowel anastomsis, placed circular stapling cap through the stomach wall, assisted in creating the gastric pouch, fired various stapling devices, obtained hemostasis along staple lines via hemoclips where appropriate,  retrieved all specimens from the abdominal cavity, closed fascial defects, and sutured incisions    Anesthesia: General     Specimen:  None    EBL: less than 10 cc    Implants: Bard 3d MID Large mesh    Complications: none      STATEMENT OF MEDICAL NECESSITY: The patient is a 65y.o.-year-old  male who has had a history of symptomatic left inguinal hernia that is increasingly bothersome and affecting his daily activities. Additionally he was found to have an asymptomatic umbilical hernia on exam that he also requested repair of. I explained to the patient the risks associated with this procedure and he understood all this very clearly and did wish to proceed with operative intervention at this time period.       OPERATIVE PROCEDURE: The patient was brought to the operating room, placed on the table in the supine position at which time period general anesthesia was administered without any difficulty. The abdomen was then prepped and draped in the usual sterile fashion. Access was gained to the abdominal cavity using a 5 mm laparoscopic Optiview technique in the upper midline. The abdomen was insufflated and inspected for entry injuries for which there were none. 8 mm robotic ports were placed in the left and right mid abdomen about a handsbreadth lateral from the camera port under direct visualization, and the midline port was upsized to a robotic trocar. Bilateral TAP blocks were performed using a 0.25% Marcaine under direct visualization. At the conclusion of the case, a left-sided ilioinguinal nerve block was also performed using 0.25% Marcaine. The robot was then docked and targeted to the appropriate anatomy. The inguinal region was inspected and there was noted to be a left pantaloon inguinal hernia. I began dissection on the left side. I started creation of a peritoneal flap just cephalad to the level of the ASIS and this was open from a lateral to medial direction. This avascular plane was developed down to the level of the pubis and the space of Retzius was dissected. Care was taken to avoid injury to the bladder at this point. Then proceeded lateral and dissected space lateral to the internal ring to allow appropriate seating of the mesh. I then proceeded to dissect the hernia. Cephalad traction was made on the peritoneal flap to reduce the indirect hernia, which was quite small. There was also a small direct hernia which was mostly containing preperitoneal fat that was also easily reduced. Peritoneal edge was carefully dissected away from the cord structures using a combination of blunt dissection and electrocautery. The vas deferens was identified and cord structures were protected from harm. Space lateral to the cord structures was inspected for a cord lipoma and no cord lipoma was identified.   The peritoneal edge was dissected far cephalad to allow appropriate seating of the mesh. A Bard 3D MID mesh was introduced into the abdomen, unrolled, and positioned to cover the myopectineal orifice of the reduced hernia. Was first secured to Arian's ligament using an interrupted 3-0 Vicryl suture and then tacked at 2 additional points superiorly to secure the mesh. Following placement, the inferior edge sat flat over the myopectineal orifice without curling when the peritoneal edges approximated. The peritoneal edge was then closed using a running 3-O V-loc suture. The robot was then undocked, the abdomen was desufflated, and all port sites were removed. Skin of all port sites was closed using 4-0 Monocryl and Steri strips to the closed skin incisions. We then turned our attention to the umbilical hernia. An infraumbilical curvilinear incision was made subcutaneous tissues were dissected until the anterior fascia was encountered. Using blunt dissection, the umbilical stalk was encircled using a clamp, and then transected at its base. There was some bleeding from the edge of the rectus muscle that was controlled with energy. The hernia measured approximately 1 cm. The surrounding fascia was cleared. I closed the defect using 0 PDS in a vest over pants fashion. He required a total of 3 sutures to completely close the defect without any gaps. I then tacked the base of the umbilicus down to the closure using 3-0 Vicryl suture. The infraumbilical incision was then closed with a running 4-0 Monocryl suture. At the conclusion of the case, all lap, sponge, instrument counts were correct. The patient was awoken from general anesthesia uneventfully. I was scrubbed for all portions of the procedure.       Sharyle Dandy, MD, FACS, [facility]

## 2022-09-12 NOTE — DISCHARGE SUMMARY
General Surgery Discharge Progress Note    Admission Date: 9/12/2022    Discharge Date: 9/12/2022      Admission Diagnosis:  Left inguinal hernia      Comorbidities:  no significant previous medical problems    Discharge Diagnosis:   Left pantaloon inguinal hernia  Umbilical hernia     Procedures:   Robotic Left inguinal hernia repair  Open primary umbilical hernia repair      Postop Complications: none    Hospital Course:  Patient was admitted on 9/12/2022 for scheduled outpatient surgery. Operation was without significant complication. Patient was stable postoperatively and was dispositioned home appropriately. Discharge Diet:  Clear liquids advanced to regular diet as tolerated    Discharge Medications:  Current Discharge Medication List        START taking these medications    Details   docusate sodium (COLACE) 100 mg capsule Take 1 Capsule by mouth two (2) times a day for 30 days. Qty: 60 Capsule, Refills: 0  Start date: 9/12/2022, End date: 10/12/2022           CONTINUE these medications which have NOT CHANGED    Details   calcium carb, citrate/vit D3 (CITRACAL-D3 SLOW RELEASE PO) Take  by mouth. Calcium 1200 mg and Vitamin D3 1000 IU      cholecalciferol, vitamin D3, (VITAMIN D3 PO) Take 10,000 Units by mouth daily. pediatric multivitamin no.76 (FLINTSTONES COMPLETE PO) Take  by mouth daily. Children's chewable      irbesartan-hydroCHLOROthiazide (AVALIDE) 150-12.5 mg per tablet daily.                Discharge disposition: home    Discharge condition: stable      Local wound care with daily showers starting 24 hours post op, keep wounds clean and dry     Activity: as desired, no lifting, pushing, or pulling greater than 15lbs or situps for 30 days     Special Instructions:            - No driving until activity is not influenced by incisional pain and off narcotics            - No bath or hot tub until wounds are healed            - Check pulse and temperature twice daily for 10 days            - Notify [facility] for a Temp >100.5 or Pulse>115     Follow-up with your surgeon in 2 weeks, call office for appointment  [phone]

## 2022-09-12 NOTE — ANESTHESIA PREPROCEDURE EVALUATION
Relevant Problems   No relevant active problems       Anesthetic History   No history of anesthetic complications            Review of Systems / Medical History  Patient summary reviewed and pertinent labs reviewed    Pulmonary  Within defined limits                 Neuro/Psych   Within defined limits           Cardiovascular    Hypertension: well controlled                   GI/Hepatic/Renal           Liver disease     Endo/Other  Within defined limits           Other Findings              Physical Exam    Airway  Mallampati: II  TM Distance: 4 - 6 cm  Neck ROM: normal range of motion   Mouth opening: Normal     Cardiovascular               Dental  No notable dental hx       Pulmonary                 Abdominal  GI exam deferred       Other Findings            Anesthetic Plan    ASA: 2  Anesthesia type: general          Induction: Intravenous  Anesthetic plan and risks discussed with: Patient

## 2022-09-12 NOTE — DISCHARGE INSTRUCTIONS
DISCHARGE SUMMARY from Nurse    PATIENT INSTRUCTIONS:    After general anesthesia or intravenous sedation, for 24 hours or while taking prescription Narcotics:  Limit your activities  Do not drive and operate hazardous machinery  Do not make important personal or business decisions  Do  not drink alcoholic beverages  If you have not urinated within 8 hours after discharge, please contact your surgeon on call. Report the following to your surgeon:  Excessive pain, swelling, redness or odor of or around the surgical area  Temperature over 100.5  Nausea and vomiting lasting longer than 4 hours or if unable to take medications  Any signs of decreased circulation or nerve impairment to extremity: change in color, persistent  numbness, tingling, coldness or increase pain  Any questions    These are general instructions for a healthy lifestyle:    No smoking/ No tobacco products/ Avoid exposure to second hand smoke  Surgeon General's Warning:  Quitting smoking now greatly reduces serious risk to your health. Obesity, smoking, and sedentary lifestyle greatly increases your risk for illness    A healthy diet, regular physical exercise & weight monitoring are important for maintaining a healthy lifestyle    You may be retaining fluid if you have a history of heart failure or if you experience any of the following symptoms:  Weight gain of 3 pounds or more overnight or 5 pounds in a week, increased swelling in our hands or feet or shortness of breath while lying flat in bed. Please call your doctor as soon as you notice any of these symptoms; do not wait until your next office visit. The discharge information has been reviewed with the patient and son darcy. The patient and son darcy verbalized understanding.   Discharge medications reviewed with the patient and son darcy and appropriate educational materials and side effects teaching were provided. ___________________________________________________________________________________________________________________________________     Abdominal Hernia Repair: What to Expect at Home  Your Recovery  After surgery to repair your hernia, you are likely to have pain for a few days. You may also feel tired and have less energy than normal. This is common. You should feel better after a few days and will probably feel much better in 7 days. For several weeks you may feel discomfort or pulling in the hernia repair when you move. You may have some bruising around the area of the repair. This is normal.  This care sheet gives you a general idea about how long it will take for you to recover. But each person recovers at a different pace. Follow the steps below to get better as quickly as possible. How can you care for yourself at home? Activity    Rest when you feel tired. Getting enough sleep will help you recover. Try to walk each day. Start by walking a little more than you did the day before. Bit by bit, increase the amount you walk. Walking boosts blood flow and helps prevent pneumonia and constipation. If your doctor gives you an abdominal binder to wear, use it as directed. This is an elastic bandage that wraps around your belly and upper hips. It helps support your belly muscles after surgery. Avoid strenuous activities, such as biking, jogging, weight lifting, or aerobic exercise, until your doctor says it is okay. Avoid lifting anything that would make you strain. This may include heavy grocery bags and milk containers, a heavy briefcase or backpack, cat litter or dog food bags, a vacuum , or a child. Ask your doctor when you can drive again. Most people are able to return to work within 1 to 2 weeks after surgery. But if your job requires that you do heavy lifting or strenuous activity, you may need to take 4 to 6 weeks off from work.      You may shower 24 to 48 hours after surgery, if your doctor okays it. Pat the cut (incision) dry. Do not take a bath for the first 2 weeks, or until your doctor tells you it is okay. Ask your doctor when it is okay for you to have sex. Diet    You can eat your normal diet. If your stomach is upset, try bland, low-fat foods like plain rice, broiled chicken, toast, and yogurt. Drink plenty of fluids (unless your doctor tells you not to). You may notice that your bowel movements are not regular right after your surgery. This is common. Avoid constipation and straining with bowel movements. You may want to take a fiber supplement every day. If you have not had a bowel movement after a couple of days, ask your doctor about taking a mild laxative. Medicines    Your doctor will tell you if and when you can restart your medicines. You will also be given instructions about taking any new medicines. If you take aspirin or some other blood thinner, ask your doctor if and when to start taking it again. Make sure that you understand exactly what your doctor wants you to do. Be safe with medicines. Take pain medicines exactly as directed. If the doctor gave you a prescription medicine for pain, take it as prescribed. If you are not taking a prescription pain medicine, ask your doctor if you can take an over-the-counter medicine. If your doctor prescribed antibiotics, take them as directed. Do not stop taking them just because you feel better. You need to take the full course of antibiotics. If you think your pain medicine is making you sick to your stomach: Take your medicine after meals (unless your doctor has told you not to). Ask your doctor for a different pain medicine. Incision care    If you have strips of tape on the cut (incision) the doctor made, leave the tape on for a week or until it falls off. Or follow your doctor's instructions for removing the tape.      If you have staples closing the cut, you will need to visit your doctor in 1 to 2 weeks to have them removed. Wash the area daily with warm, soapy water, and pat it dry. Don't use hydrogen peroxide or alcohol, which can slow healing. You may cover the area with a gauze bandage if it weeps or rubs against clothing. Change the bandage every day. Other instructions    Hold a pillow over your incision when you cough or take deep breaths. This will support your belly and decrease your pain. Do breathing exercises at home as instructed by your doctor. This will help prevent pneumonia. If you had laparoscopic surgery, you may also have pain in your shoulder. The pain usually lasts about a day or two. Follow-up care is a key part of your treatment and safety. Be sure to make and go to all appointments, and call your doctor if you are having problems. It's also a good idea to know your test results and keep a list of the medicines you take. When should you call for help? Call 911 anytime you think you may need emergency care. For example, call if:    You passed out (lost consciousness). You are short of breath. Call your doctor now or seek immediate medical care if:    You are sick to your stomach and cannot drink fluids. You have signs of a blood clot in your leg (called a deep vein thrombosis), such as:  Pain in your calf, back of the knee, thigh, or groin. Redness and swelling in your leg or groin. You have signs of infection, such as: Increased pain, swelling, warmth, or redness. Red streaks leading from the incision. Pus draining from the incision. A fever. You cannot pass stools or gas. You have pain that does not get better after you take pain medicine. You have loose stitches, or your incision comes open. Bright red blood has soaked through the bandage over your incision. Watch closely for changes in your health, and be sure to contact your doctor if you have any problems. Where can you learn more?   Go to http://jorgito-francisco.info/  Enter B577 in the search box to learn more about \"Abdominal Hernia Repair: What to Expect at Home. \"  Current as of: September 8, 2021               Content Version: 13.2  © 2006-2022 [employer]. Care instructions adapted under license by [employer] (which disclaims liability or warranty for this information). If you have questions about a medical condition or this instruction, always ask your healthcare professional. [facility] [address] any warranty or liability for your use of this information. Inguinal Hernia Repair Surgery: What to Expect at Home  Your Recovery     After surgery to repair a hernia, you're likely to have pain for a few days. You may also feel tired and have less energy than normal. This is common. You should start to feel better after a few days. And you'll probably feel much better in 7 days. For a few weeks you may feel discomfort or pulling in the groin area when you move. You may have some bruising near the repair site and on your genitals. This is normal.  This care sheet gives you a general idea about how long it will take for you to recover. But each person recovers at a different pace. Follow the steps below to get better as quickly as possible. How can you care for yourself at home? Activity    Rest when you feel tired. You may shower 24 to 48 hours after surgery, if your doctor okays it. Pat the incision dry. Do not take a bath for the first 2 weeks, or until your doctor tells you it is okay. Allow the area to heal. Don't move quickly or lift anything heavy until you are feeling better. Be active. Walking is a good choice. You most likely can return to light activity after 1 to 3 weeks, depending on the type of surgery you had. Diet    You can eat your normal diet. If your stomach is upset, try bland, low-fat foods like plain rice, broiled chicken, toast, and yogurt. If your bowel movements are not regular right after surgery, try to avoid constipation and straining. Drink plenty of water. Your doctor may suggest fiber, a stool softener, or a mild laxative. Medicines    Be safe with medicines. Read and follow all instructions on the label. If the doctor gave you a prescription medicine for pain, take it as prescribed. If you are not taking a prescription pain medicine, ask your doctor if you can take an over-the-counter medicine. Your doctor will tell you if and when you can restart your medicines. He or she will also give you instructions about taking any new medicines. Incision care    You will have a dressing over the cut (incision). A dressing helps the cut heal and protects it. Your doctor will tell you how to take care of this. If you have skin adhesive on the cut, leave it on until it falls off. Skin adhesive is also called liquid stitches or glue. If you have strips of tape on the cut, leave the tape on for a week or until it falls off. If you had stitches, your doctor will tell you when to come back to have them removed. Wash the area daily with warm, soapy water, and pat it dry. Don't use hydrogen peroxide or alcohol. They can slow healing. Ice    Put ice or a cold pack on the area for 10 to 20 minutes at a time. Try to do this every 1 to 2 hours for the next 3 days (when you are awake) or until the swelling goes down. Put a thin cloth between the ice and your skin. Follow-up care is a key part of your treatment and safety. Be sure to make and go to all appointments, and call your doctor if you are having problems. It's also a good idea to know your test results and keep a list of the medicines you take. When should you call for help? Call 911 anytime you think you may need emergency care. For example, call if:    You passed out (lost consciousness). You are short of breath.    Call your doctor now or seek immediate medical care if:    You have pain that does not get better after you take pain medicine. You have loose stitches, or your incision comes open. Bright red blood has soaked through your bandage. You are sick to your stomach or cannot drink fluids. You have signs of a blood clot in your leg (called a deep vein thrombosis), such as:  Pain in your calf, back of the knee, thigh, or groin. Redness and swelling in your leg or groin. You cannot pass stools or gas. You have symptoms of infection, such as: Increased pain, swelling, warmth, or redness. Red streaks leading from the incision. Pus draining from the incision. A fever. Watch closely for changes in your health, and be sure to contact your doctor if you have any problems. Where can you learn more? Go to http://www.gray.com/  Enter D758 in the search box to learn more about \"Inguinal Hernia Repair Surgery: What to Expect at Home. \"  Current as of: September 8, 2021               Content Version: 13.2  © 2006-2022 [employer]. Care instructions adapted under license by [employer] (which disclaims liability or warranty for this information). If you have questions about a medical condition or this instruction, always ask your healthcare professional. [place] [address] any warranty or liability for your use of this information.

## 2022-09-28 ENCOUNTER — OFFICE VISIT (OUTPATIENT)
Dept: SURGERY | Age: 62
End: 2022-09-28
Payer: COMMERCIAL

## 2022-09-28 VITALS
DIASTOLIC BLOOD PRESSURE: 74 MMHG | BODY MASS INDEX: 23.62 KG/M2 | OXYGEN SATURATION: 99 % | TEMPERATURE: 98 F | SYSTOLIC BLOOD PRESSURE: 116 MMHG | HEART RATE: 66 BPM | WEIGHT: 165 LBS | RESPIRATION RATE: 18 BRPM | HEIGHT: 70 IN

## 2022-09-28 DIAGNOSIS — K40.90 INGUINAL HERNIA WITHOUT OBSTRUCTION OR GANGRENE, RECURRENCE NOT SPECIFIED, UNSPECIFIED LATERALITY: Primary | ICD-10-CM

## 2022-09-28 PROCEDURE — 99024 POSTOP FOLLOW-UP VISIT: CPT | Performed by: STUDENT IN AN ORGANIZED HEALTH CARE EDUCATION/TRAINING PROGRAM

## 2022-09-28 NOTE — PROGRESS NOTES
General Surgery Follow Up    Patient: Terry Barthel MRN: [id number]  SSN: [id number]    YOB: 1960  Age: 65 y.o. Sex: male      Subjective:      Terry Barthel is a 65 y.o. male who is being seen for follow up after robotic assisted laparoscopic left pantaloon inguinal hernia repair, and open primary umbilical hernia repair. Overall he is doing quite well. He says that he has taken ibuprofen for pain control, most often at the end of the day and more so within the first week postoperatively, a total of 5-7 times. He does note some occasional groin soreness at the end of the day or after standing or walking long distances. He denies any recurrent bulges, constipation, skin changes, or other complaints. He reports that his preoperative hernia associated pain is resolved. Allergies   Allergen Reactions    Penicillins Hives    Pollen Extracts Sneezing     Past Medical History:   Diagnosis Date    Calcium deficiency 01/01/2015    Chronic viral hepatitis B ([address]     Hypertension     Osteoporosis     Tibia fracture 01/01/2015    Vitamin D deficiency 01/01/2015     Past Surgical History:   Procedure Laterality Date    HX APPENDECTOMY      HX HEENT  2018    lower jaw surgery      Current Outpatient Medications   Medication Sig Dispense Refill    docusate sodium (COLACE) 100 mg capsule Take 1 Capsule by mouth two (2) times a day for 30 days. 60 Capsule 0    calcium carb, citrate/vit D3 (CITRACAL-D3 SLOW RELEASE PO) Take  by mouth. Calcium 1200 mg and Vitamin D3 1000 IU      cholecalciferol, vitamin D3, (VITAMIN D3 PO) Take 10,000 Units by mouth daily. pediatric multivitamin no.76 (FLINTSTONES COMPLETE PO) Take  by mouth daily. Children's chewable      irbesartan-hydroCHLOROthiazide (AVALIDE) 150-12.5 mg per tablet daily.        Social History     Tobacco Use    Smoking status: Never    Smokeless tobacco: Never   Substance Use Topics    Alcohol use: No     Family History   Problem Relation Spoke with Endocrinology, would resume Janumet  mg BID. Pt may follow up in the office at [phone] - [address] [facility], [address].    ADS  [address] Age of Onset    Diabetes Mother            Review of Systems:    General: Denies fevers, chills, night sweats, fatigue, weight loss, or weight gain. HEENT: Denies changes in auditory or visual acuity, recurrent pharyngitis, epistaxis, chronic rhinorrhea, vertigo    Respiratory: Denies increasing shortness of breath, productive cough, hemoptysis    Cardiac: Denies known history of cardiac disease, heart murmur, palpitations    GI: Denies dysphagia, recurrent emesis, hematemesis, changes in bowel habits, hematochezia, melena    : Denies hematuria frequency urgency dysuria    Musculoskeletal: Denies fractures, dislocations    Neurologic: Denies history of CVA, paralysis paresthesias, recurrent cephalgia, seizures    Endocrine: Denies polyuria, polydipsia, polyphagia, heat and cold intolerance    Lymph/heme: Denies a history of malignancy, anemia, bruising, blood transfusions    Integumentary: Negative for dermatitis     Psych: Denies a history of depression or anxiety    Objective:     Vitals:    09/28/22 1114   BP: 116/74   Pulse: 66   Resp: 18   Temp: 98 °F (36.7 °C)   SpO2: 99%   Weight: 74.8 kg (165 lb)   Height: 5' 10\" (1.778 m)        General: no acute distress, nontoxic in appearance. Head: Normocephalic, atraumatic  Resp: Unlabored on room air, no audible wheezes  Cardio: Regular rate and rhythm,   Abdomen: soft, nondistended. Incisions appropriately tender to palpation, clean/dry/intact and healing without issue. Steri-Strips are still in place. No recurrent hernias appreciated. Assessment:      This patient is a 65 y.o. male presenting 2 weeks status post robotic assisted laparoscopic right inguinal hernia repair and open primary umbilical hernia repair      Plan:     Overall, is doing very well for 2 weeks postop  Continue NSAIDs as needed and ice packs for groin soreness  Okay to remove Steri-Strips and I suggested he do this while in the shower  Continue lifting restrictions out to 4 weeks postoperatively.   I would like him to minimize core workouts where possible as he had specifically asked about the elliptical and jogging  Follow-up on an as-needed basis    Signed By: Tejas Spring MD     September 28, 2022

## 2022-09-28 NOTE — PROGRESS NOTES
Graeme Tan is a 65 y.o. male  Chief Complaint   Patient presents with    Post OP Follow Up     8/66/5518 Umbilical and left inguinal hernia repair with mesh     Visit Vitals  /74 (BP 1 Location: Left upper arm, BP Patient Position: Sitting)   Pulse 66   Temp 98 °F (36.7 °C)   Resp 18   Ht 5' 10\" (1.778 m)   Wt 165 lb (74.8 kg)   SpO2 99%   BMI 23.68 kg/m²     1. Have you been to the ER, urgent care clinic since your last visit? Hospitalized since your last visit? No    2. Have you seen or consulted any other health care providers outside of the [address] [place] [address] since your last visit? Include any pap smears or colon screening.  No

## 2025-05-05 ENCOUNTER — TRANSCRIBE ORDERS (OUTPATIENT)
Facility: HOSPITAL | Age: 65
End: 2025-05-05

## 2025-05-05 DIAGNOSIS — H90.3 SENSORINEURAL HEARING LOSS, BILATERAL: Primary | ICD-10-CM

## 2025-05-08 ENCOUNTER — TRANSCRIBE ORDERS (OUTPATIENT)
Facility: HOSPITAL | Age: 65
End: 2025-05-08

## 2025-05-08 DIAGNOSIS — H90.3 SENSORY HEARING LOSS, BILATERAL: Primary | ICD-10-CM

## 2025-05-23 ENCOUNTER — HOSPITAL ENCOUNTER (OUTPATIENT)
Age: 65
Discharge: HOME | End: 2025-05-26
Payer: COMMERCIAL

## 2025-05-23 DIAGNOSIS — H90.3 SENSORINEURAL HEARING LOSS, BILATERAL: ICD-10-CM

## 2025-05-23 DIAGNOSIS — H90.3 SENSORY HEARING LOSS, BILATERAL: ICD-10-CM

## 2025-05-23 PROCEDURE — A9575 INJ GADOTERATE MEGLUMI 0.1ML: HCPCS | Performed by: NURSE PRACTITIONER

## 2025-05-23 PROCEDURE — 70553 MRI BRAIN STEM W/O & W/DYE: CPT

## 2025-05-23 PROCEDURE — 6360000004 HC RX CONTRAST MEDICATION: Performed by: NURSE PRACTITIONER

## 2025-05-23 PROCEDURE — 70544 MR ANGIOGRAPHY HEAD W/O DYE: CPT

## 2025-05-23 RX ADMIN — GADOTERATE MEGLUMINE 16 ML: 376.9 INJECTION INTRAVENOUS at 08:09

## (undated) DEVICE — GARMENT,MEDLINE,DVT,INT,CALF,MED, GEN2: Brand: MEDLINE

## (undated) DEVICE — COLUMN DRAPE

## (undated) DEVICE — Device

## (undated) DEVICE — TRAY PREP DRY W/ PREM GLV 2 APPL 6 SPNG 2 UNDPD 1 OVERWRAP

## (undated) DEVICE — GLOVE SURG SZ 8 L11.77IN FNGR THK9.8MIL STRW LTX POLYMER

## (undated) DEVICE — REM POLYHESIVE ADULT PATIENT RETURN ELECTRODE: Brand: VALLEYLAB

## (undated) DEVICE — AIRSEAL BIFURCATED SMOKE EVAC FILTERED TUBE SET: Brand: AIRSEAL

## (undated) DEVICE — GLOVE SURG BIOGEL 8.0 STRL -- SKINSENSE

## (undated) DEVICE — 3M™ IOBAN™ 2 ANTIMICROBIAL INCISE DRAPE 6651EZ: Brand: IOBAN™ 2

## (undated) DEVICE — BANDAGE ADH W0.75XL3IN UNIV WVN FAB NAT GEN USE STRP N ADH

## (undated) DEVICE — COVER LT HNDL BLU STRL -- MEDICHOICE

## (undated) DEVICE — STERILE POLYISOPRENE POWDER-FREE SURGICAL GLOVES: Brand: PROTEXIS

## (undated) DEVICE — BLADELESS OBTURATOR: Brand: WECK VISTA

## (undated) DEVICE — DRAPE TOWEL: Brand: CONVERTORS

## (undated) DEVICE — SOLUTION IV 1000ML 0.9% SOD CHL

## (undated) DEVICE — COVER MPLR TIP CRV SCIS ACC DA VINCI

## (undated) DEVICE — INTENDED FOR TISSUE SEPARATION, AND OTHER PROCEDURES THAT REQUIRE A SHARP SURGICAL BLADE TO PUNCTURE OR CUT.: Brand: BARD-PARKER ®  SAFETY SCALPED

## (undated) DEVICE — ELECTRO LUBE IS A SINGLE PATIENT USE DEVICE THAT IS INTENDED TO BE USED ON ELECTROSURGICAL ELECTRODES TO REDUCE STICKING.: Brand: KEY SURGICAL ELECTRO LUBE

## (undated) DEVICE — STRIP,CLOSURE,WOUND,MEDI-STRIP,1/2X4: Brand: MEDLINE

## (undated) DEVICE — TRAY,URINE METER,100% SILICONE,16FR10ML: Brand: MEDLINE

## (undated) DEVICE — ARM DRAPE

## (undated) DEVICE — MAYO STAND COVER: Brand: CONVERTORS

## (undated) DEVICE — SYR 10ML LUER LOK 1/5ML GRAD --

## (undated) DEVICE — MASTISOL ADHESIVE LIQ 2/3ML

## (undated) DEVICE — NEEDLE HYPO 25GA L1.5IN BVL ORIENTED ECLIPSE

## (undated) DEVICE — VISUALIZATION SYSTEM: Brand: CLEARIFY

## (undated) DEVICE — SUTURE V-LOC 180 SZ 3-0 L6IN ABSRB VLT CV-23 L17MM 1/2 CIR VLOCM0804

## (undated) DEVICE — BLANKET WRM AD W50XL85.8IN PACU FULL BODY FORC AIR

## (undated) DEVICE — SEAL UNIV 5-8MM DISP BX/10 -- DA VINCI XI - SNGL USE

## (undated) DEVICE — SUTURE MCRYL SZ 4-0 L27IN ABSRB UD L24MM PS-1 3/8 CIR PRIM Y935H

## (undated) DEVICE — SUTURE VCRL SZ 2-0 L27IN ABSRB VLT L26MM CT-2 1/2 CIR J333H

## (undated) DEVICE — LAPAROSCOPIC TROCAR SLEEVE/SINGLE USE: Brand: KII® OPTICAL ACCESS SYSTEM

## (undated) DEVICE — KIT CLN UP BON SECOURS MARYV